# Patient Record
Sex: MALE | Race: AMERICAN INDIAN OR ALASKA NATIVE
[De-identification: names, ages, dates, MRNs, and addresses within clinical notes are randomized per-mention and may not be internally consistent; named-entity substitution may affect disease eponyms.]

---

## 2019-06-01 ENCOUNTER — HOSPITAL ENCOUNTER (EMERGENCY)
Dept: HOSPITAL 31 - C.ER | Age: 39
Discharge: LEFT BEFORE DISCHARGE | End: 2019-06-01
Payer: SELF-PAY

## 2019-06-01 VITALS — TEMPERATURE: 97.5 F

## 2019-06-01 VITALS — DIASTOLIC BLOOD PRESSURE: 78 MMHG | HEART RATE: 88 BPM | SYSTOLIC BLOOD PRESSURE: 110 MMHG | RESPIRATION RATE: 20 BRPM

## 2019-06-01 VITALS — OXYGEN SATURATION: 95 %

## 2019-06-01 DIAGNOSIS — F19.129: Primary | ICD-10-CM

## 2019-06-01 PROCEDURE — 82948 REAGENT STRIP/BLOOD GLUCOSE: CPT

## 2019-06-01 PROCEDURE — 99285 EMERGENCY DEPT VISIT HI MDM: CPT

## 2019-06-01 NOTE — C.PDOC
History Of Present Illness


37 y/o M BIBEMS with intoxication. Full HPI/ROS unobtainable due to clinical 

condition. Patient sleeping.


Time Seen by Provider: 06/01/19 17:39


Chief Complaint (Nursing): Substance Abuse





Past Medical History


Vital Signs: 





                                Last Vital Signs











Temp  97.5 F L  06/01/19 17:21


 


Pulse  79   06/01/19 18:03


 


Resp  10 L  06/01/19 18:03


 


BP  110/74   06/01/19 17:21


 


Pulse Ox  95   06/01/19 18:03











Primary Care Provider: FAMILY PROVIDER,NO


Family History: States: Unknown Family Hx





- Social History


Hx Alcohol Use: Yes


Hx Substance Use: Yes





- Immunization History


Hx Tetanus Toxoid Vaccination: No


Hx Influenza Vaccination: No


Hx Pneumococcal Vaccination: No





Review Of Systems


Review Of Systems: ROS cannot be obtained secondary to pt's inabilty to answer 

questions.





Physical Exam





- Physical Exam


Additional Physical Exam Comments: 


gen drowsy


head nc/at


eyes constricted


ent mmm


neck supple


cv reg rate


lungs breathing spontaneously


abd soft, nt


extremities no deformity


skin no rash


neuro drowsy





ED Course And Treatment


O2 Sat by Pulse Oximetry: 95





Medical Decision Making


Medical Decision Making: 


presumed opioid overdose. on tele monitor, patient with normal pulse oximetry 

and breathing spontaneously.





Patient awoke, alert, responds normally. Will observe. 





Patient eloped after 2 hours and 20 minutes.





Disposition





- Disposition


Disposition: ELOPEMENT - ER ONLY


Disposition Time: 19:36


Condition: STABLE


Forms:  [facility] (English)





- Clinical Impression


Clinical Impression: 


 Intoxication no abrasions, no jaundice, no lesions, no pruritis, and no rashes.